# Patient Record
Sex: FEMALE | Race: WHITE | URBAN - METROPOLITAN AREA
[De-identification: names, ages, dates, MRNs, and addresses within clinical notes are randomized per-mention and may not be internally consistent; named-entity substitution may affect disease eponyms.]

---

## 2021-05-10 ENCOUNTER — APPOINTMENT (RX ONLY)
Dept: URBAN - METROPOLITAN AREA CLINIC 41 | Facility: CLINIC | Age: 41
Setting detail: DERMATOLOGY
End: 2021-05-10

## 2021-05-10 DIAGNOSIS — L71.8 OTHER ROSACEA: ICD-10-CM | Status: INADEQUATELY CONTROLLED

## 2021-05-10 PROCEDURE — ? PRESCRIPTION

## 2021-05-10 PROCEDURE — 99214 OFFICE O/P EST MOD 30 MIN: CPT

## 2021-05-10 PROCEDURE — ? COUNSELING

## 2021-05-10 PROCEDURE — ? TOPICAL RETINOID COUNSELING

## 2021-05-10 PROCEDURE — ? PRESCRIPTION MEDICATION MANAGEMENT

## 2021-05-10 PROCEDURE — ? ADDITIONAL NOTES

## 2021-05-10 PROCEDURE — ? MEDICAL CONSULTATION: PULSED-DYE LASER

## 2021-05-10 RX ORDER — IVERMECTIN 10 MG/G
CREAM TOPICAL
Qty: 1 | Refills: 2 | Status: ERX | COMMUNITY
Start: 2021-05-10

## 2021-05-10 RX ORDER — SULFACETAMIDE SODIUM AND SULFUR 90; 45 MG/G; MG/G
CREAM TOPICAL
Qty: 1 | Refills: 2 | Status: ERX | COMMUNITY
Start: 2021-05-10

## 2021-05-10 RX ORDER — TRETIONIN 0.5 MG/G
CREAM TOPICAL
Qty: 1 | Refills: 2 | Status: ERX | COMMUNITY
Start: 2021-05-10

## 2021-05-10 RX ADMIN — SULFACETAMIDE SODIUM AND SULFUR: 90; 45 CREAM TOPICAL at 00:00

## 2021-05-10 RX ADMIN — TRETIONIN: 0.5 CREAM TOPICAL at 00:00

## 2021-05-10 RX ADMIN — IVERMECTIN: 10 CREAM TOPICAL at 00:00

## 2021-05-10 ASSESSMENT — LOCATION SIMPLE DESCRIPTION DERM
LOCATION SIMPLE: GLABELLA
LOCATION SIMPLE: LEFT CHEEK
LOCATION SIMPLE: RIGHT CHEEK

## 2021-05-10 ASSESSMENT — LOCATION DETAILED DESCRIPTION DERM
LOCATION DETAILED: LEFT INFERIOR CENTRAL MALAR CHEEK
LOCATION DETAILED: RIGHT INFERIOR CENTRAL MALAR CHEEK
LOCATION DETAILED: GLABELLA

## 2021-05-10 ASSESSMENT — LOCATION ZONE DERM: LOCATION ZONE: FACE

## 2021-05-10 NOTE — HPI: RASH (ROSACEA)
How Severe Is Your Rosacea?: moderate
Is This A New Presentation, Or A Follow-Up?: Follow Up Rosacea
Additional History: Pt notes none if the previous topicals have been effective for her. Believes this is more acne than rosacea

## 2021-05-10 NOTE — PROCEDURE: PRESCRIPTION MEDICATION MANAGEMENT
Render In Strict Bullet Format?: No
Detail Level: Zone
Continue Regimen: Aczone gel qam \\nFinacea foam qhs
Initiate Treatment: Soolantra qhs\\nSumadan qd

## 2021-05-10 NOTE — PROCEDURE: ADDITIONAL NOTES
Detail Level: Simple
Additional Notes: Patient consent was obtained to proceed with the visit and recommended plan of care after discussion of all risks and benefits, including the risks of COVID-19 exposure.
Render Risk Assessment In Note?: no
Additional Notes: Pt denies any improvement with previous treatments. Notes doxycycline gave her bad yeast infection\\n\\nSP notes larger pimples and pustules are still possible with rosacea. Explains accutane wouldn’t be best course of action for pt as these are more inflammatory. Discussed starting low dose doxy which would minimize risks of yeast infection\\n\\nPt notes VBeam laser wasn’t helpful for her but only had 1 session\\n\\nSP explains pts often need multiple sessions so settings can be adjusted accordingly. Should pt opt to retry VBeam, pt to d/c tretinoin for at least a week before tx \\n\\nPt notes after she started doxycycline, has had vulvar pain. Given this, SP notes even low dose doxy likely isn’t suitable for pt

## 2021-06-03 ENCOUNTER — APPOINTMENT (RX ONLY)
Dept: URBAN - METROPOLITAN AREA CLINIC 41 | Facility: CLINIC | Age: 41
Setting detail: DERMATOLOGY
End: 2021-06-03

## 2021-06-03 DIAGNOSIS — Z41.9 ENCOUNTER FOR PROCEDURE FOR PURPOSES OTHER THAN REMEDYING HEALTH STATE, UNSPECIFIED: ICD-10-CM

## 2021-06-03 PROCEDURE — ? ADDITIONAL NOTES

## 2021-06-03 PROCEDURE — ? VBEAM PRIMA LASER

## 2021-06-03 ASSESSMENT — LOCATION DETAILED DESCRIPTION DERM
LOCATION DETAILED: RIGHT INFERIOR MEDIAL MALAR CHEEK
LOCATION DETAILED: LEFT INFERIOR MEDIAL MALAR CHEEK

## 2021-06-03 ASSESSMENT — LOCATION ZONE DERM: LOCATION ZONE: FACE

## 2021-06-03 ASSESSMENT — LOCATION SIMPLE DESCRIPTION DERM
LOCATION SIMPLE: LEFT CHEEK
LOCATION SIMPLE: RIGHT CHEEK

## 2021-06-03 NOTE — PROCEDURE: ADDITIONAL NOTES
Render Risk Assessment In Note?: no
Additional Notes: Photos and consent obtained prior. Sp counseled on expectations, risks, adverse reactions, post care. Area of desired treatment confirmed with patient prior. All persons in room wore protective eye ware
Detail Level: Simple

## 2021-06-03 NOTE — HPI: COSMETIC (LASER RED SPOTS)
Have You Had Red Spots Treated With Laser Before?: has had previous treatments
Additional History: Pt presents for v beam total face

## 2021-06-03 NOTE — PROCEDURE: VBEAM PRIMA LASER
Comments: SP treats red, deeper bumps at these settings and pinpoints them
Location Override: face, general
Spot Size: 7 mm
Pulse Duration: 1.5 ms
Spot Size: 10 mm
Cryogen Time (Ms): 30
Fluence In J/Cm2 (Optional): 10
Post-Procedure Care: Vaseline and ice applied. Post care reviewed with patient.
Pulse Duration: 6 ms
Treated Area: medium area
Delay Time (Ms): 20
Location (Required For Details To Render In Note): full face
Price (Use Numbers Only, No Special Characters Or $): 500
Consent: Written consent obtained, risks reviewed including but not limited to crusting, scabbing, blistering, scarring, darker or lighter pigmentary change, incidental hair removal, bruising, and/or incomplete removal.
Detail Level: Zone
Fluence In J/Cm2 (Optional): 7
Post-Care Instructions: I reviewed with the patient in detail post-care instructions. Patient should stay away from the sun and wear sun protection until treated areas are fully healed.
Spot Size: 5 mm

## 2021-08-05 ENCOUNTER — APPOINTMENT (RX ONLY)
Dept: URBAN - METROPOLITAN AREA CLINIC 41 | Facility: CLINIC | Age: 41
Setting detail: DERMATOLOGY
End: 2021-08-05

## 2021-08-05 DIAGNOSIS — Z41.9 ENCOUNTER FOR PROCEDURE FOR PURPOSES OTHER THAN REMEDYING HEALTH STATE, UNSPECIFIED: ICD-10-CM

## 2021-08-05 PROCEDURE — ? ADDITIONAL NOTES

## 2021-08-05 PROCEDURE — ? VBEAM PRIMA LASER

## 2021-08-05 ASSESSMENT — LOCATION SIMPLE DESCRIPTION DERM
LOCATION SIMPLE: LEFT CHEEK
LOCATION SIMPLE: RIGHT CHEEK

## 2021-08-05 ASSESSMENT — LOCATION ZONE DERM: LOCATION ZONE: FACE

## 2021-08-05 NOTE — PROCEDURE: ADDITIONAL NOTES
Render Risk Assessment In Note?: no
Additional Notes: Photos and consent obtained prior. SP counseled on expectations, risks, adverse reactions, post care. Area of desired treatment confirmed with patient prior. All persons in room wore protective eye ware
Detail Level: Simple
Additional Notes: Patient consent was obtained to proceed with the visit and recommended plan of care after discussion of all risks and benefits, including the risks of COVID-19 exposure.

## 2021-08-05 NOTE — PROCEDURE: VBEAM PRIMA LASER
Comments: SP treats red, deeper bumps at these settings and pinpoints them
Location Override: face, general
Spot Size: 7 mm
Pulse Duration: 1.5 ms
Spot Size: 10 mm
Cryogen Time (Ms): 30
Fluence In J/Cm2 (Optional): 10
Post-Procedure Care: Vaseline and ice applied. Post care reviewed with patient.
Pulse Duration: 6 ms
Treated Area: medium area
Delay Time (Ms): 20
Location (Required For Details To Render In Note): full face
Price (Use Numbers Only, No Special Characters Or $): 500
Consent: Written consent obtained, risks reviewed including but not limited to crusting, scabbing, blistering, scarring, darker or lighter pigmentary change, incidental hair removal, bruising, and/or incomplete removal.
Detail Level: Zone
Fluence In J/Cm2 (Optional): 7
Cryogen Time (Ms): 30
Post-Care Instructions: I reviewed with the patient in detail post-care instructions. Patient should stay away from the sun and wear sun protection until treated areas are fully healed.
Spot Size: 5 mm

## 2021-10-07 ENCOUNTER — APPOINTMENT (RX ONLY)
Dept: URBAN - METROPOLITAN AREA CLINIC 41 | Facility: CLINIC | Age: 41
Setting detail: DERMATOLOGY
End: 2021-10-07

## 2021-10-07 DIAGNOSIS — Z41.9 ENCOUNTER FOR PROCEDURE FOR PURPOSES OTHER THAN REMEDYING HEALTH STATE, UNSPECIFIED: ICD-10-CM

## 2021-10-07 PROCEDURE — ? VBEAM PERFECTA LASER

## 2021-10-07 PROCEDURE — ? ADDITIONAL NOTES

## 2021-10-07 NOTE — PROCEDURE: VBEAM PERFECTA LASER
Fluence (J/Cm2): 7
Post-Care Instructions: I reviewed with the patient in detail post-care instructions.  Cool compresses or cold gel packs may be applied after treatment.  Exposure to the sun should be avoided and sun protection and sunscreen should be used.
Treatment Number: 3
Delay Time (Ms): 0
Delay Time (Ms): 40
Pulse Duration: 40 ms
Spot Size: 10 mm
Pre-Procedure: The treatment areas identified by the patient were cleansed and treatment was performed with the parameters mentioned above.
Is There A Second Setting?: yes
Post-Procedure: Following the procedure the post care instructions were reviewed with the patient.
Location: face
Is There A Fourth Setting?: no
Detail Level: Generalized
Fluence (J/Cm2): 14
Spot Size: 5 mm
Consent: Written consent obtained.  Risks were reviewed including but not limited to crusting, scabbing, blistering, scarring, darker or lighter pigmentary change, bruising, and/or incomplete response.
Pulse Duration: 10 ms
Price (Use Numbers Only, No Special Characters Or $): 500

## 2022-04-07 ENCOUNTER — APPOINTMENT (RX ONLY)
Dept: URBAN - METROPOLITAN AREA CLINIC 41 | Facility: CLINIC | Age: 42
Setting detail: DERMATOLOGY
End: 2022-04-07

## 2022-04-07 DIAGNOSIS — I78.8 OTHER DISEASES OF CAPILLARIES: ICD-10-CM | Status: STABLE

## 2022-04-07 DIAGNOSIS — Z41.9 ENCOUNTER FOR PROCEDURE FOR PURPOSES OTHER THAN REMEDYING HEALTH STATE, UNSPECIFIED: ICD-10-CM

## 2022-04-07 PROBLEM — D48.5 NEOPLASM OF UNCERTAIN BEHAVIOR OF SKIN: Status: ACTIVE | Noted: 2022-04-07

## 2022-04-07 PROCEDURE — ? COUNSELING

## 2022-04-07 PROCEDURE — ? BIOPSY BY SHAVE METHOD

## 2022-04-07 PROCEDURE — ? ADDITIONAL NOTES

## 2022-04-07 PROCEDURE — 11102 TANGNTL BX SKIN SINGLE LES: CPT

## 2022-04-07 PROCEDURE — ? PULSED-DYE LASER

## 2022-04-07 NOTE — HPI: SKIN LESION
Is This A New Presentation, Or A Follow-Up?: Skin Lesion
Additional History: Patient has a skin lesion she would like evaluated.

## 2022-04-07 NOTE — PROCEDURE: PULSED-DYE LASER
Price (Use Numbers Only, No Special Characters Or $): 500
Post-Care Instructions: I reviewed with the patient in detail post-care instructions. Patient should stay away from the sun and wear sun protection until treated areas are fully healed.
Delay Time (Ms): 20
Detail Level: Zone
Fluence In J/Cm2 (Optional): 14
Cryogen Time (Ms): 30
Pulse Duration: 6 ms
Pulse Duration: 10 ms
Spot Size: 5 mm
Laser Type: Vbeam 595nm
Spot Size: 7 mm
Fluence In J/Cm2 (Optional): 7
Pulse Duration: 20 ms
Consent: Written consent obtained, risks reviewed including but not limited to crusting, scabbing, blistering, scarring, darker or lighter pigmentary change, incidental hair removal, bruising, and/or incomplete removal.
Post-Procedure Care: Vaseline and ice applied. Post care reviewed with patient.
Spot Size: 10 mm

## 2022-04-07 NOTE — PROCEDURE: ADDITIONAL NOTES
Detail Level: Simple
Additional Notes: Patient consent was obtained to proceed with the visit and recommended plan of care after discussion of all risks and benefits, including the risks of COVID-19 exposure.
Render Risk Assessment In Note?: no
Additional Notes: SP notes the patient could use a CO2 laser.

## 2022-04-11 ENCOUNTER — APPOINTMENT (RX ONLY)
Dept: URBAN - METROPOLITAN AREA CLINIC 41 | Facility: CLINIC | Age: 42
Setting detail: DERMATOLOGY
End: 2022-04-11

## 2022-04-11 DIAGNOSIS — Z02.9 ENCOUNTER FOR ADMINISTRATIVE EXAMINATIONS, UNSPECIFIED: ICD-10-CM

## 2023-06-08 ENCOUNTER — APPOINTMENT (RX ONLY)
Dept: URBAN - METROPOLITAN AREA CLINIC 41 | Facility: CLINIC | Age: 43
Setting detail: DERMATOLOGY
End: 2023-06-08

## 2023-06-08 DIAGNOSIS — Z41.9 ENCOUNTER FOR PROCEDURE FOR PURPOSES OTHER THAN REMEDYING HEALTH STATE, UNSPECIFIED: ICD-10-CM

## 2023-06-08 PROCEDURE — ? VBEAM PERFECTA LASER

## 2023-06-08 PROCEDURE — ? ADDITIONAL NOTES

## 2023-06-08 ASSESSMENT — LOCATION ZONE DERM: LOCATION ZONE: FACE

## 2023-06-08 ASSESSMENT — LOCATION SIMPLE DESCRIPTION DERM
LOCATION SIMPLE: LEFT CHEEK
LOCATION SIMPLE: RIGHT CHEEK

## 2023-06-08 NOTE — PROCEDURE: ADDITIONAL NOTES
Detail Level: Simple
Additional Notes: Patient consent was obtained to proceed with the visit and recommended plan of care after discussion of all risks and benefits, including the risks of COVID-19 exposure.
Additional Notes: I advised pt this is the first time I am seeing her and the first time I am assessing her skin. I recommend her coming into office for evaluation before next laser. I treated all areas pt requested. I advised her that lasers can be helping but redness is challenging and nothing will get rid of it 100%. There were a few other areas that pt had concerns about but I would like to re-evaluate once areas heal
Render Risk Assessment In Note?: no

## 2023-06-08 NOTE — PROCEDURE: VBEAM PERFECTA LASER
Detail Level: Zone
Delay Time (Ms): 0
Post-Care Instructions: I reviewed with the patient in detail post-care instructions.  Cool compresses or cold gel packs may be applied after treatment.  Exposure to the sun should be avoided and sun protection and sunscreen should be used.
Pre-Procedure: The treatment areas identified by the patient were cleansed and treatment was performed with the parameters mentioned above.
Is There A Third Setting?: no
Pulse Duration: 10 ms
Post-Procedure: Following the procedure the post care instructions were reviewed with the patient.
Fluence (J/Cm2): 7
Spot Size: 10 mm
Treatment Number: 5
Consent: Written consent obtained.  Risks were reviewed including but not limited to crusting, scabbing, blistering, scarring, darker or lighter pigmentary change, bruising, and/or incomplete response.
Price (Use Numbers Only, No Special Characters Or $): 500